# Patient Record
Sex: FEMALE | Race: WHITE | Employment: UNEMPLOYED | URBAN - METROPOLITAN AREA
[De-identification: names, ages, dates, MRNs, and addresses within clinical notes are randomized per-mention and may not be internally consistent; named-entity substitution may affect disease eponyms.]

---

## 2017-05-30 ENCOUNTER — HOSPITAL ENCOUNTER (OUTPATIENT)
Dept: ULTRASOUND IMAGING | Age: 32
Discharge: HOME | End: 2017-05-30
Attending: OTOLARYNGOLOGY
Payer: COMMERCIAL

## 2017-05-30 DIAGNOSIS — E04.2 NONTOXIC MULTINODULAR GOITER: ICD-10-CM

## 2017-05-30 PROCEDURE — 76536 US EXAM OF HEAD AND NECK: CPT | Performed by: OTOLARYNGOLOGY

## 2017-12-14 ENCOUNTER — OFFICE VISIT (OUTPATIENT)
Dept: INTERNAL MEDICINE CLINIC | Facility: CLINIC | Age: 32
End: 2017-12-14

## 2017-12-14 VITALS
HEART RATE: 78 BPM | RESPIRATION RATE: 17 BRPM | SYSTOLIC BLOOD PRESSURE: 90 MMHG | HEIGHT: 64 IN | DIASTOLIC BLOOD PRESSURE: 62 MMHG | BODY MASS INDEX: 22.71 KG/M2 | WEIGHT: 133 LBS | TEMPERATURE: 98 F

## 2017-12-14 DIAGNOSIS — J06.9 ACUTE UPPER RESPIRATORY INFECTION: Primary | ICD-10-CM

## 2017-12-14 PROCEDURE — 87880 STREP A ASSAY W/OPTIC: CPT | Performed by: INTERNAL MEDICINE

## 2017-12-14 PROCEDURE — 99213 OFFICE O/P EST LOW 20 MIN: CPT | Performed by: INTERNAL MEDICINE

## 2017-12-14 RX ORDER — CODEINE PHOSPHATE AND GUAIFENESIN 10; 100 MG/5ML; MG/5ML
5 SOLUTION ORAL EVERY 6 HOURS PRN
Qty: 118 ML | Refills: 0 | Status: SHIPPED | OUTPATIENT
Start: 2017-12-14 | End: 2018-07-09 | Stop reason: ALTCHOICE

## 2017-12-14 RX ORDER — AZITHROMYCIN 250 MG/1
TABLET, FILM COATED ORAL
Qty: 6 TABLET | Refills: 0 | Status: SHIPPED | OUTPATIENT
Start: 2017-12-14 | End: 2018-07-09 | Stop reason: ALTCHOICE

## 2017-12-14 NOTE — PROGRESS NOTES
Chucho Nolasco is a 28year old female. HPI:   Patient presents with:  Sore Throat: getting worse every day   Stuffy Nose: stuffy and runny nose  Patient presents with acute upper respiratory complaints. Symptoms going on for a week.   On the first da Diabetes in her maternal aunt; Hypertension in her father; Thyroid Disorder in her sister. Social:  reports that she has never smoked. She has never used smokeless tobacco. She reports that she drinks alcohol. She reports that she does not use drugs.   Wt

## 2017-12-14 NOTE — PATIENT INSTRUCTIONS
- You likely have a viral infection. Your strep test was negative. - Start codeine cough syrup. It can make you drowsy, so start by taking it at night only - if you take it during the day, be careful when driving/operating machinery.     - If you are not

## 2018-07-09 ENCOUNTER — OFFICE VISIT (OUTPATIENT)
Dept: INTERNAL MEDICINE CLINIC | Facility: CLINIC | Age: 33
End: 2018-07-09

## 2018-07-09 VITALS
SYSTOLIC BLOOD PRESSURE: 112 MMHG | DIASTOLIC BLOOD PRESSURE: 62 MMHG | TEMPERATURE: 99 F | WEIGHT: 137.75 LBS | RESPIRATION RATE: 18 BRPM | HEART RATE: 77 BPM | HEIGHT: 64 IN | BODY MASS INDEX: 23.52 KG/M2 | OXYGEN SATURATION: 99 %

## 2018-07-09 DIAGNOSIS — J02.9 SORE THROAT: Primary | ICD-10-CM

## 2018-07-09 PROCEDURE — 99213 OFFICE O/P EST LOW 20 MIN: CPT | Performed by: INTERNAL MEDICINE

## 2018-07-09 RX ORDER — CLARITHROMYCIN 250 MG/1
250 TABLET, FILM COATED ORAL 2 TIMES DAILY
Qty: 20 TABLET | Refills: 0 | Status: SHIPPED | OUTPATIENT
Start: 2018-07-09 | End: 2018-07-19

## 2018-07-09 NOTE — PROGRESS NOTES
Rubi Collier  10/18/1985 is a 28year old female who presents for upper respiratory symptoms    Patient presents with:  Sore Throat        HPI:   Pt reports  respiratory symptoms for  Few days   Throat sore had fever.        Current Outpatient Prescript visit:    Sore throat  -     clarithromycin 250 MG Oral Tab; Take 1 tablet (250 mg total) by mouth 2 (two) times daily. There are no Patient Instructions on file for this visit.     The patient indicates understanding of these issues and agrees to th

## 2018-09-11 DIAGNOSIS — G43.909 MIGRAINE: ICD-10-CM

## 2018-09-11 NOTE — TELEPHONE ENCOUNTER
Patient calling in requesting a refill for SUMAtriptan Succinate (IMITREX) 100 MG Oral Tab    To be sent to:   [address], [address] [place] [address], [phone], [phone]

## 2018-09-12 NOTE — TELEPHONE ENCOUNTER
Patient is calling to check on the status of her refill for SUMAtriptan Succinate (IMITREX) 100 MG Oral Tab. Patient is out of her prescription.  [facility] [address] [place] [address], [place] [address] AT [facility] OF [address] [place] [address], [phone], [phone]

## 2018-09-13 RX ORDER — SUMATRIPTAN 100 MG/1
100 TABLET, FILM COATED ORAL EVERY 2 HOUR PRN
Qty: 30 TABLET | Refills: 0 | Status: SHIPPED | OUTPATIENT
Start: 2018-09-13 | End: 2018-10-08

## 2018-09-13 NOTE — TELEPHONE ENCOUNTER
Medication(s) to Refill:   Requested Prescriptions     Pending Prescriptions Disp Refills   • SUMAtriptan Succinate 100 MG Oral Tab 30 tablet 0     Sig: Take 1 tablet (100 mg total) by mouth every 2 (two) hours as needed for Migraine (max 2 in 24 hrs).

## 2018-10-05 DIAGNOSIS — G43.909 MIGRAINE: ICD-10-CM

## 2018-10-05 NOTE — TELEPHONE ENCOUNTER
SUMAtriptan Succinate 100 MG   [facility] [id number] - [facility] AT [facility] OF [address] [place] [address], [phone], [phone]

## 2018-10-08 RX ORDER — SUMATRIPTAN 100 MG/1
100 TABLET, FILM COATED ORAL EVERY 2 HOUR PRN
Qty: 30 TABLET | Refills: 0 | Status: SHIPPED | OUTPATIENT
Start: 2018-10-08 | End: 2019-10-08

## 2019-01-29 ENCOUNTER — OFFICE VISIT (OUTPATIENT)
Dept: FAMILY MEDICINE CLINIC | Facility: CLINIC | Age: 34
End: 2019-01-29
Payer: COMMERCIAL

## 2019-01-29 VITALS
WEIGHT: 132 LBS | BODY MASS INDEX: 22.53 KG/M2 | TEMPERATURE: 98 F | HEIGHT: 64 IN | RESPIRATION RATE: 20 BRPM | OXYGEN SATURATION: 97 % | DIASTOLIC BLOOD PRESSURE: 70 MMHG | SYSTOLIC BLOOD PRESSURE: 98 MMHG | HEART RATE: 74 BPM

## 2019-01-29 DIAGNOSIS — J01.00 ACUTE NON-RECURRENT MAXILLARY SINUSITIS: Primary | ICD-10-CM

## 2019-01-29 PROCEDURE — 99213 OFFICE O/P EST LOW 20 MIN: CPT | Performed by: NURSE PRACTITIONER

## 2019-01-29 RX ORDER — DOXYCYCLINE HYCLATE 100 MG/1
100 CAPSULE ORAL 2 TIMES DAILY
Qty: 14 CAPSULE | Refills: 0 | Status: SHIPPED | OUTPATIENT
Start: 2019-01-29 | End: 2019-02-05

## 2019-01-29 NOTE — PROGRESS NOTES
CHIEF COMPLAINT:   Patient presents with:  Nasal Congestion: post nasal drip,ear/sinus pressure,chest congestion and coughing x 3 wks      HPI:   Atilio Callaway is a 35year old female who presents for cold symptoms for  3  weeks.  Symptoms have progressed i GENERAL:  normal appetite  SKIN: no rashes or abnormal skin lesions  HEENT: See HPI.     LUNGS: denies shortness of breath or wheezing, See HPI  CARDIOVASCULAR: denies chest pain or palpitations   GI: denies N/V/C or abdominal pain  NEURO: + sinus headaches • Doxycycline Hyclate 100 MG Oral Cap 14 capsule 0     Sig: Take 1 capsule (100 mg total) by mouth 2 (two) times daily for 7 days. Risks, benefits, side effects of medication addressed and explained.     Patient Instructions     Sinusitis (Antibiotic · You can use an over-the-counter decongestant, unless a similar medicine was prescribed to you. Nasal sprays work the fastest. Use one that contains phenylephrine or oxymetazoline. First blow your nose gently. Then use the spray.  Do not use these medicine · Don’t have close contact with people who have sore throats, colds, or other upper respiratory infections. · Don’t smoke, and stay away from secondhand smoke. · Stay up to date with of your vaccines.   Date Last Reviewed: 11/1/2017  © 7299-6577 [employer]

## 2020-02-01 ENCOUNTER — OFFICE VISIT (OUTPATIENT)
Dept: FAMILY MEDICINE CLINIC | Facility: CLINIC | Age: 35
End: 2020-02-01
Payer: COMMERCIAL

## 2020-02-01 VITALS
HEART RATE: 90 BPM | HEIGHT: 64 IN | TEMPERATURE: 98 F | RESPIRATION RATE: 18 BRPM | DIASTOLIC BLOOD PRESSURE: 70 MMHG | SYSTOLIC BLOOD PRESSURE: 107 MMHG | OXYGEN SATURATION: 97 % | WEIGHT: 125 LBS | BODY MASS INDEX: 21.34 KG/M2

## 2020-02-01 DIAGNOSIS — J10.1 INFLUENZA A: Primary | ICD-10-CM

## 2020-02-01 DIAGNOSIS — Z3A.11 11 WEEKS GESTATION OF PREGNANCY: ICD-10-CM

## 2020-02-01 DIAGNOSIS — R68.89 FLU-LIKE SYMPTOMS: ICD-10-CM

## 2020-02-01 LAB
OPERATOR ID: ABNORMAL
POCT INFLUENZA A: POSITIVE
POCT INFLUENZA B: NEGATIVE

## 2020-02-01 PROCEDURE — 87502 INFLUENZA DNA AMP PROBE: CPT | Performed by: FAMILY MEDICINE

## 2020-02-01 PROCEDURE — 99213 OFFICE O/P EST LOW 20 MIN: CPT | Performed by: FAMILY MEDICINE

## 2020-02-01 RX ORDER — OSELTAMIVIR PHOSPHATE 75 MG/1
75 CAPSULE ORAL 2 TIMES DAILY
Qty: 10 CAPSULE | Refills: 0 | Status: SHIPPED | OUTPATIENT
Start: 2020-02-01 | End: 2020-07-16 | Stop reason: ALTCHOICE

## 2020-02-01 NOTE — PROGRESS NOTES
Ramos Combs is a 29year old female. S:  Patient presents today with the following concerns:  Flu (flu like symptoms bodyaches, chills, sweats , cough, x 2 days    11 weeks pregnant. Pregnancy going well.  c -sections. Got flu vaccination. adenopathy  LUNGS: CTA, no RRW  CARDIO: RRR without murmur  EXTREMITIES: no edema  NEURO: Oriented times three,cranial nerves are intact,motor and sensory are grossly intact    Rapid Flu Test is POSITIVE for Influenza A  Negative for Influenza B.    ASSESS

## 2020-02-01 NOTE — PATIENT INSTRUCTIONS
Influenza (Adult)    Influenza is also called the flu. It is a viral illness that affects the air passages of your lungs. It is different from the common cold. The flu can easily be passed from one to person to another.  It may be spread through the air b If you are age 72 or older, talk with your provider about getting a pneumococcal vaccine every 5 years. You should also get this vaccine if you have chronic asthma or COPD. All adults should get a flu vaccine every fall. Ask your provider about this.   When Washing your hands often with soap and water can help keep you from catching the flu virus. Who is at risk for the flu? Anyone can get the flu.  But you are more likely to catch the flu if you:  · Are often around young children  · Work in a healthcare s · Your healthcare provider may prescribe medicines called antivirals. These medicines must be taken within 2 days of when your symptoms started.  In some cases, your healthcare provider may not wait for test results to come back before starting you on antiv · Stay away from crowds and children as much as possible while you are pregnant. Stay away from anyone who has the flu. Tips for good handwashing  Handwashing is one of the best ways to prevent many common infections.  Follow these steps for more effective

## 2020-02-12 ENCOUNTER — OFFICE VISIT (OUTPATIENT)
Dept: FAMILY MEDICINE CLINIC | Facility: CLINIC | Age: 35
End: 2020-02-12
Payer: COMMERCIAL

## 2020-02-12 VITALS
RESPIRATION RATE: 18 BRPM | WEIGHT: 128 LBS | BODY MASS INDEX: 21.85 KG/M2 | DIASTOLIC BLOOD PRESSURE: 60 MMHG | HEART RATE: 77 BPM | HEIGHT: 64 IN | TEMPERATURE: 98 F | SYSTOLIC BLOOD PRESSURE: 100 MMHG | OXYGEN SATURATION: 99 %

## 2020-02-12 DIAGNOSIS — H10.022 OTHER MUCOPURULENT CONJUNCTIVITIS OF LEFT EYE: Primary | ICD-10-CM

## 2020-02-12 DIAGNOSIS — J01.40 ACUTE PANSINUSITIS, RECURRENCE NOT SPECIFIED: ICD-10-CM

## 2020-02-12 PROCEDURE — 99213 OFFICE O/P EST LOW 20 MIN: CPT | Performed by: NURSE PRACTITIONER

## 2020-02-12 RX ORDER — AZITHROMYCIN 250 MG/1
TABLET, FILM COATED ORAL
Qty: 6 TABLET | Refills: 0 | Status: SHIPPED | OUTPATIENT
Start: 2020-02-12 | End: 2020-07-16 | Stop reason: ALTCHOICE

## 2020-02-12 RX ORDER — OFLOXACIN 3 MG/ML
2 SOLUTION/ DROPS OPHTHALMIC 4 TIMES DAILY
Qty: 1 BOTTLE | Refills: 0 | Status: SHIPPED | OUTPATIENT
Start: 2020-02-12 | End: 2020-02-19

## 2020-02-12 NOTE — PROGRESS NOTES
CHIEF COMPLAINT:   Patient presents with:  Sinus Problem: s/s for 1 week. No OTC meds taken  Eye Problem: left eye drainage, tearing s/s for 2 days      HPI:   Kee Jeans is a 29year old female who presents for cold symptoms for  1  weeks.  Symptoms ha Smoking status: Never Smoker      Smokeless tobacco: Never Used    Alcohol use: Not Currently      Alcohol/week: 0.0 standard drinks      Comment: rare    Drug use: No        REVIEW OF SYSTEMS:   GENERAL:  denies  diminished appetite  SKIN: no rashes o • ofloxacin 0.3 % Ophthalmic Solution 1 Bottle 0     Sig: Place 2 drops into the left eye 4 (four) times daily for 7 days. • azithromycin (ZITHROMAX Z-JAGRUTI) 250 MG Oral Tab 6 tablet 0     Sig: Take two tablets by mouth today, then one tablet daily. · Increased blurry vision or increased sensitivity to light  · Failure of normal vision to return within 24 to 48 hours  Date Last Reviewed: 7/1/2017  © 2923-4339 [employer]. [address] [facility] [place] [facility], [address] [place] [address]. All rights reserved.  Sulema Barthel Your doctor may prescribe medications to help treat your sinusitis. If you have an infection, antibiotics can help clear it up. If you are prescribed antibiotics, take all pills on schedule until they are gone, even if you feel better.  Decongestants help r · You can use an over-the-counter decongestant, unless a similar medicine was prescribed to you. Nasal sprays work the fastest. Use one that contains phenylephrine or oxymetazoline. First blow your nose gently. Then use the spray.  Do not use these medicine · Don’t have close contact with people who have sore throats, colds, or other upper respiratory infections. · Don’t smoke, and stay away from secondhand smoke. · Stay up to date with of your vaccines.   Date Last Reviewed: 11/1/2017  © 1948-2687 [employer]

## 2020-02-12 NOTE — PATIENT INSTRUCTIONS
Humidifier in room  Sleep propped  Push fluids  Limit dairy  Benadryl as needed    Conjunctivitis, Nonspecific    The membrane that covers the white part of your eye (the conjunctiva) is inflamed.  Inflammation happens when your body responds to an injury Self-Care for Sinusitis     Drinking plenty of water can help sinuses drain. Sinusitis can often be managed with self-care. Self-care can keep sinuses moist and make you feel more comfortable. Remember to follow your doctor's instructions closely.  This c Sinusitis (Antibiotic Treatment)    The sinuses are air-filled spaces within the bones of the face. They connect to the inside of the nose. Sinusitis is an inflammation of the tissue that lines the sinuses. Sinusitis can occur during a cold.  It can also ha · Do not use nasal rinses or irrigation during an acute sinus infection, unless your healthcare provider tells you to. Rinsing may spread the infection to other areas in your sinuses.   · Use acetaminophen or ibuprofen to control pain, unless another pain m

## 2020-02-25 PROBLEM — Z34.80 SUPERVISION OF OTHER NORMAL PREGNANCY, ANTEPARTUM (HCC): Status: ACTIVE | Noted: 2020-02-25

## 2020-02-25 PROBLEM — Z34.80 SUPERVISION OF OTHER NORMAL PREGNANCY, ANTEPARTUM: Status: ACTIVE | Noted: 2020-02-25

## 2020-03-11 PROBLEM — O34.219 PREVIOUS CESAREAN DELIVERY AFFECTING PREGNANCY: Status: ACTIVE | Noted: 2020-03-11

## 2020-03-11 PROBLEM — O34.219 PREVIOUS CESAREAN DELIVERY AFFECTING PREGNANCY (HCC): Status: ACTIVE | Noted: 2020-03-11

## 2020-05-29 ENCOUNTER — HOSPITAL ENCOUNTER (OUTPATIENT)
Facility: HOSPITAL | Age: 35
Setting detail: OBSERVATION
Discharge: HOME | End: 2020-05-30
Attending: OBSTETRICS & GYNECOLOGY | Admitting: OBSTETRICS & GYNECOLOGY
Payer: COMMERCIAL

## 2020-05-29 VITALS
HEART RATE: 75 BPM | SYSTOLIC BLOOD PRESSURE: 102 MMHG | TEMPERATURE: 98 F | DIASTOLIC BLOOD PRESSURE: 59 MMHG | RESPIRATION RATE: 16 BRPM

## 2020-05-29 PROBLEM — Z34.90 PREGNANCY: Status: ACTIVE | Noted: 2020-05-29

## 2020-05-29 PROBLEM — Z34.90 PREGNANCY (HCC): Status: ACTIVE | Noted: 2020-05-29

## 2020-05-30 PROCEDURE — 86900 BLOOD TYPING SEROLOGIC ABO: CPT | Performed by: OBSTETRICS & GYNECOLOGY

## 2020-05-30 PROCEDURE — 36415 COLL VENOUS BLD VENIPUNCTURE: CPT

## 2020-05-30 PROCEDURE — 85018 HEMOGLOBIN: CPT | Performed by: OBSTETRICS & GYNECOLOGY

## 2020-05-30 PROCEDURE — 86780 TREPONEMA PALLIDUM: CPT | Performed by: OBSTETRICS & GYNECOLOGY

## 2020-05-30 PROCEDURE — 86901 BLOOD TYPING SEROLOGIC RH(D): CPT | Performed by: OBSTETRICS & GYNECOLOGY

## 2020-05-30 PROCEDURE — 86850 RBC ANTIBODY SCREEN: CPT | Performed by: OBSTETRICS & GYNECOLOGY

## 2020-05-30 PROCEDURE — 86701 HIV-1ANTIBODY: CPT | Performed by: OBSTETRICS & GYNECOLOGY

## 2020-05-30 PROCEDURE — 99213 OFFICE O/P EST LOW 20 MIN: CPT

## 2020-05-30 PROCEDURE — 85014 HEMATOCRIT: CPT | Performed by: OBSTETRICS & GYNECOLOGY

## 2020-05-30 PROCEDURE — 82950 GLUCOSE TEST: CPT | Performed by: OBSTETRICS & GYNECOLOGY

## 2020-05-30 NOTE — PLAN OF CARE
Pt would like to do 28 week glucose challenge now, Dr. Sai Horne states pt may do it tonight. Lab called for instructions. Pt to drink 50 glucose within 5 minutes, then draw glucose at 60 minute delmi. Pt finished glucose at 2300, will draw patient blood at 0000.

## 2020-05-30 NOTE — PLAN OF CARE
Pt is a  at 28.1 weeks in triage for s/p fall at 1630. Pt states she tripped over a toy car and fell on her hands and knees, did not hit abdomen. Pt denies cramping or vaginal bleeding. efm tested and applied.

## 2020-05-30 NOTE — NST
Nonstress Test   Patient: Kelly Ortez    Gestation: 28w2d    NST:       Variability: Moderate           Accelerations: Yes           Decelerations: None            Baseline: 130 BPM           Uterine Irritability: Yes           Contractions: Irregular

## 2020-05-30 NOTE — PLAN OF CARE
Labs drawn. Pt off monitors. Discharge instructions reviewed including s/s of  labor. Pt verbalizes understanding and is in agreement. Pt left unit ambulatory. pt has office visit .

## 2020-06-29 DIAGNOSIS — Z34.90 PREGNANCY: Primary | ICD-10-CM

## 2020-08-13 PROBLEM — Z34.90 PREGNANCY: Status: RESOLVED | Noted: 2020-05-29 | Resolved: 2020-08-13

## 2020-08-13 PROBLEM — Z34.80 SUPERVISION OF OTHER NORMAL PREGNANCY, ANTEPARTUM: Status: RESOLVED | Noted: 2020-02-25 | Resolved: 2020-08-13

## 2020-08-13 PROBLEM — Z34.90 PREGNANCY (HCC): Status: RESOLVED | Noted: 2020-05-29 | Resolved: 2020-08-13

## 2020-08-13 PROBLEM — Z34.80 SUPERVISION OF OTHER NORMAL PREGNANCY, ANTEPARTUM (HCC): Status: RESOLVED | Noted: 2020-02-25 | Resolved: 2020-08-13

## 2020-08-15 ENCOUNTER — LAB ENCOUNTER (OUTPATIENT)
Dept: LAB | Facility: HOSPITAL | Age: 35
End: 2020-08-15
Attending: OBSTETRICS & GYNECOLOGY
Payer: COMMERCIAL

## 2020-08-15 DIAGNOSIS — Z34.90 PREGNANCY: ICD-10-CM

## 2020-08-16 ENCOUNTER — TELEPHONE (OUTPATIENT)
Dept: OBGYN UNIT | Facility: HOSPITAL | Age: 35
End: 2020-08-16

## 2020-08-16 RX ORDER — PHYTONADIONE (VIT K1) 100 MCG
550 TABLET ORAL
COMMUNITY
End: 2021-05-27 | Stop reason: ALTCHOICE

## 2020-08-16 RX ORDER — MULTIVIT WITH MINERALS/LUTEIN
1000 TABLET ORAL DAILY
COMMUNITY

## 2020-08-16 NOTE — PROGRESS NOTES
Pt given arrival instructions. RN reviewed  procedures, general plan of care, and  pain medication options. Questions answered. Pt verbalizes understanding. Travel and covid screening done.

## 2020-08-17 LAB — SARS-COV-2 RNA RESP QL NAA+PROBE: NOT DETECTED

## 2020-08-18 ENCOUNTER — ANESTHESIA (OUTPATIENT)
Dept: OBGYN UNIT | Facility: HOSPITAL | Age: 35
End: 2020-08-18
Payer: COMMERCIAL

## 2020-08-18 ENCOUNTER — ANESTHESIA EVENT (OUTPATIENT)
Dept: OBGYN UNIT | Facility: HOSPITAL | Age: 35
End: 2020-08-18
Payer: COMMERCIAL

## 2020-08-18 ENCOUNTER — HOSPITAL ENCOUNTER (INPATIENT)
Facility: HOSPITAL | Age: 35
LOS: 3 days | Discharge: HOME | End: 2020-08-21
Attending: OBSTETRICS & GYNECOLOGY | Admitting: OBSTETRICS & GYNECOLOGY
Payer: COMMERCIAL

## 2020-08-18 PROBLEM — Z34.90 PREGNANCY (HCC): Status: ACTIVE | Noted: 2020-08-18

## 2020-08-18 PROBLEM — Z34.90 PREGNANCY: Status: ACTIVE | Noted: 2020-08-18

## 2020-08-18 LAB
ANTIBODY SCREEN: NEGATIVE
BASOPHILS # BLD AUTO: 0.03 X10(3) UL (ref 0–0.2)
BASOPHILS NFR BLD AUTO: 0.3 %
DEPRECATED RDW RBC AUTO: 42.9 FL (ref 35.1–46.3)
EOSINOPHIL # BLD AUTO: 0.13 X10(3) UL (ref 0–0.7)
EOSINOPHIL NFR BLD AUTO: 1.5 %
ERYTHROCYTE [DISTWIDTH] IN BLOOD BY AUTOMATED COUNT: 13.2 % (ref 11–15)
FETAL SCREEN RESULT: NEGATIVE
HCT VFR BLD AUTO: 37.7 % (ref 35–48)
HGB BLD-MCNC: 12.6 G/DL (ref 12–16)
IMM GRANULOCYTES # BLD AUTO: 0.05 X10(3) UL (ref 0–1)
IMM GRANULOCYTES NFR BLD: 0.6 %
LYMPHOCYTES # BLD AUTO: 2.22 X10(3) UL (ref 1–4)
LYMPHOCYTES NFR BLD AUTO: 25.1 %
MCH RBC QN AUTO: 30.4 PG (ref 26–34)
MCHC RBC AUTO-ENTMCNC: 33.4 G/DL (ref 31–37)
MCV RBC AUTO: 90.8 FL (ref 80–100)
MONOCYTES # BLD AUTO: 0.59 X10(3) UL (ref 0.1–1)
MONOCYTES NFR BLD AUTO: 6.7 %
NEUTROPHILS # BLD AUTO: 5.84 X10 (3) UL (ref 1.5–7.7)
NEUTROPHILS # BLD AUTO: 5.84 X10(3) UL (ref 1.5–7.7)
NEUTROPHILS NFR BLD AUTO: 65.8 %
PLATELET # BLD AUTO: 262 10(3)UL (ref 150–450)
RBC # BLD AUTO: 4.15 X10(6)UL (ref 3.8–5.3)
RH BLOOD TYPE: NEGATIVE
T PALLIDUM AB SER QL IA: NONREACTIVE
WBC # BLD AUTO: 8.9 X10(3) UL (ref 4–11)

## 2020-08-18 PROCEDURE — 3E0334Z INTRODUCTION OF SERUM, TOXOID AND VACCINE INTO PERIPHERAL VEIN, PERCUTANEOUS APPROACH: ICD-10-PCS | Performed by: OBSTETRICS & GYNECOLOGY

## 2020-08-18 PROCEDURE — 86901 BLOOD TYPING SEROLOGIC RH(D): CPT | Performed by: OBSTETRICS & GYNECOLOGY

## 2020-08-18 PROCEDURE — 85461 HEMOGLOBIN FETAL: CPT | Performed by: OBSTETRICS & GYNECOLOGY

## 2020-08-18 PROCEDURE — 86850 RBC ANTIBODY SCREEN: CPT | Performed by: OBSTETRICS & GYNECOLOGY

## 2020-08-18 PROCEDURE — 86900 BLOOD TYPING SEROLOGIC ABO: CPT | Performed by: OBSTETRICS & GYNECOLOGY

## 2020-08-18 PROCEDURE — 86780 TREPONEMA PALLIDUM: CPT | Performed by: OBSTETRICS & GYNECOLOGY

## 2020-08-18 PROCEDURE — 85025 COMPLETE CBC W/AUTO DIFF WBC: CPT | Performed by: OBSTETRICS & GYNECOLOGY

## 2020-08-18 RX ORDER — GENTAMICIN SULFATE 40 MG/ML
INJECTION, SOLUTION INTRAMUSCULAR; INTRAVENOUS AS NEEDED
Status: DISCONTINUED | OUTPATIENT
Start: 2020-08-18 | End: 2020-08-18

## 2020-08-18 RX ORDER — ONDANSETRON 2 MG/ML
4 INJECTION INTRAMUSCULAR; INTRAVENOUS ONCE AS NEEDED
Status: DISCONTINUED | OUTPATIENT
Start: 2020-08-18 | End: 2020-08-18 | Stop reason: HOSPADM

## 2020-08-18 RX ORDER — SODIUM CHLORIDE, SODIUM LACTATE, POTASSIUM CHLORIDE, CALCIUM CHLORIDE 600; 310; 30; 20 MG/100ML; MG/100ML; MG/100ML; MG/100ML
INJECTION, SOLUTION INTRAVENOUS CONTINUOUS
Status: DISCONTINUED | OUTPATIENT
Start: 2020-08-18 | End: 2020-08-18 | Stop reason: HOSPADM

## 2020-08-18 RX ORDER — PHENYLEPHRINE HCL 10 MG/ML
VIAL (ML) INJECTION AS NEEDED
Status: DISCONTINUED | OUTPATIENT
Start: 2020-08-18 | End: 2020-08-18 | Stop reason: SURG

## 2020-08-18 RX ORDER — HYDROMORPHONE HYDROCHLORIDE 1 MG/ML
0.4 INJECTION, SOLUTION INTRAMUSCULAR; INTRAVENOUS; SUBCUTANEOUS EVERY 5 MIN PRN
Status: DISCONTINUED | OUTPATIENT
Start: 2020-08-18 | End: 2020-08-18 | Stop reason: HOSPADM

## 2020-08-18 RX ORDER — DIPHENHYDRAMINE HYDROCHLORIDE 50 MG/ML
25 INJECTION INTRAMUSCULAR; INTRAVENOUS ONCE AS NEEDED
Status: DISCONTINUED | OUTPATIENT
Start: 2020-08-18 | End: 2020-08-18 | Stop reason: HOSPADM

## 2020-08-18 RX ORDER — SODIUM CHLORIDE 9 MG/ML
100 INJECTION, SOLUTION INTRAVENOUS ONCE
Status: COMPLETED | OUTPATIENT
Start: 2020-08-18 | End: 2020-08-18

## 2020-08-18 RX ORDER — GENTAMICIN SULFATE 40 MG/ML
5 INJECTION, SOLUTION INTRAMUSCULAR; INTRAVENOUS ONCE
Status: COMPLETED | OUTPATIENT
Start: 2020-08-18 | End: 2020-08-18

## 2020-08-18 RX ORDER — MORPHINE SULFATE 2 MG/ML
INJECTION, SOLUTION INTRAMUSCULAR; INTRAVENOUS AS NEEDED
Status: DISCONTINUED | OUTPATIENT
Start: 2020-08-18 | End: 2020-08-18 | Stop reason: SURG

## 2020-08-18 RX ORDER — MIDAZOLAM HYDROCHLORIDE 1 MG/ML
INJECTION INTRAMUSCULAR; INTRAVENOUS AS NEEDED
Status: DISCONTINUED | OUTPATIENT
Start: 2020-08-18 | End: 2020-08-18 | Stop reason: SURG

## 2020-08-18 RX ORDER — METOCLOPRAMIDE HYDROCHLORIDE 5 MG/ML
INJECTION INTRAMUSCULAR; INTRAVENOUS AS NEEDED
Status: DISCONTINUED | OUTPATIENT
Start: 2020-08-18 | End: 2020-08-18 | Stop reason: SURG

## 2020-08-18 RX ORDER — HYDROCODONE BITARTRATE AND ACETAMINOPHEN 10; 325 MG/1; MG/1
1 TABLET ORAL EVERY 4 HOURS PRN
Status: DISCONTINUED | OUTPATIENT
Start: 2020-08-18 | End: 2020-08-21

## 2020-08-18 RX ORDER — MORPHINE SULFATE 0.5 MG/ML
0.2 INJECTION, SOLUTION EPIDURAL; INTRATHECAL; INTRAVENOUS ONCE
Status: DISCONTINUED | OUTPATIENT
Start: 2020-08-18 | End: 2020-08-18 | Stop reason: HOSPADM

## 2020-08-18 RX ORDER — IBUPROFEN 600 MG/1
600 TABLET ORAL EVERY 6 HOURS SCHEDULED
Status: DISCONTINUED | OUTPATIENT
Start: 2020-08-19 | End: 2020-08-21

## 2020-08-18 RX ORDER — KETOROLAC TROMETHAMINE 30 MG/ML
30 INJECTION, SOLUTION INTRAMUSCULAR; INTRAVENOUS ONCE AS NEEDED
Status: COMPLETED | OUTPATIENT
Start: 2020-08-18 | End: 2020-08-18

## 2020-08-18 RX ORDER — TRISODIUM CITRATE DIHYDRATE AND CITRIC ACID MONOHYDRATE 500; 334 MG/5ML; MG/5ML
30 SOLUTION ORAL ONCE
Status: COMPLETED | OUTPATIENT
Start: 2020-08-18 | End: 2020-08-18

## 2020-08-18 RX ORDER — BISACODYL 10 MG
10 SUPPOSITORY, RECTAL RECTAL
Status: DISCONTINUED | OUTPATIENT
Start: 2020-08-18 | End: 2020-08-21

## 2020-08-18 RX ORDER — ONDANSETRON 2 MG/ML
4 INJECTION INTRAMUSCULAR; INTRAVENOUS EVERY 6 HOURS PRN
Status: DISCONTINUED | OUTPATIENT
Start: 2020-08-18 | End: 2020-08-21

## 2020-08-18 RX ORDER — SIMETHICONE 80 MG
80 TABLET,CHEWABLE ORAL 3 TIMES DAILY PRN
Status: DISCONTINUED | OUTPATIENT
Start: 2020-08-18 | End: 2020-08-21

## 2020-08-18 RX ORDER — SODIUM CHLORIDE, SODIUM LACTATE, POTASSIUM CHLORIDE, CALCIUM CHLORIDE 600; 310; 30; 20 MG/100ML; MG/100ML; MG/100ML; MG/100ML
INJECTION, SOLUTION INTRAVENOUS CONTINUOUS
Status: DISCONTINUED | OUTPATIENT
Start: 2020-08-18 | End: 2020-08-21

## 2020-08-18 RX ORDER — HYDROMORPHONE HYDROCHLORIDE 1 MG/ML
0.4 INJECTION, SOLUTION INTRAMUSCULAR; INTRAVENOUS; SUBCUTANEOUS EVERY 2 HOUR PRN
Status: ACTIVE | OUTPATIENT
Start: 2020-08-18 | End: 2020-08-19

## 2020-08-18 RX ORDER — DOCUSATE SODIUM 100 MG/1
100 CAPSULE, LIQUID FILLED ORAL
Status: DISCONTINUED | OUTPATIENT
Start: 2020-08-19 | End: 2020-08-21

## 2020-08-18 RX ORDER — DIPHENHYDRAMINE HCL 25 MG
25 CAPSULE ORAL EVERY 4 HOURS PRN
Status: DISCONTINUED | OUTPATIENT
Start: 2020-08-18 | End: 2020-08-21

## 2020-08-18 RX ORDER — DIPHENHYDRAMINE HYDROCHLORIDE 50 MG/ML
12.5 INJECTION INTRAMUSCULAR; INTRAVENOUS EVERY 4 HOURS PRN
Status: DISCONTINUED | OUTPATIENT
Start: 2020-08-18 | End: 2020-08-21

## 2020-08-18 RX ORDER — NALOXONE HYDROCHLORIDE 0.4 MG/ML
0.08 INJECTION, SOLUTION INTRAMUSCULAR; INTRAVENOUS; SUBCUTANEOUS
Status: ACTIVE | OUTPATIENT
Start: 2020-08-18 | End: 2020-08-19

## 2020-08-18 RX ORDER — CLINDAMYCIN PHOSPHATE 900 MG/50ML
900 INJECTION INTRAVENOUS ONCE
Status: COMPLETED | OUTPATIENT
Start: 2020-08-18 | End: 2020-08-18

## 2020-08-18 RX ORDER — DEXAMETHASONE SODIUM PHOSPHATE 4 MG/ML
VIAL (ML) INJECTION AS NEEDED
Status: DISCONTINUED | OUTPATIENT
Start: 2020-08-18 | End: 2020-08-18 | Stop reason: SURG

## 2020-08-18 RX ORDER — ONDANSETRON 2 MG/ML
INJECTION INTRAMUSCULAR; INTRAVENOUS AS NEEDED
Status: DISCONTINUED | OUTPATIENT
Start: 2020-08-18 | End: 2020-08-18 | Stop reason: SURG

## 2020-08-18 RX ORDER — ZOLPIDEM TARTRATE 5 MG/1
5 TABLET ORAL NIGHTLY PRN
Status: DISCONTINUED | OUTPATIENT
Start: 2020-08-18 | End: 2020-08-21

## 2020-08-18 RX ORDER — KETOROLAC TROMETHAMINE 30 MG/ML
30 INJECTION, SOLUTION INTRAMUSCULAR; INTRAVENOUS EVERY 6 HOURS PRN
Status: DISPENSED | OUTPATIENT
Start: 2020-08-18 | End: 2020-08-19

## 2020-08-18 RX ORDER — MORPHINE SULFATE 4 MG/ML
2 INJECTION, SOLUTION INTRAMUSCULAR; INTRAVENOUS EVERY 2 HOUR PRN
Status: ACTIVE | OUTPATIENT
Start: 2020-08-18 | End: 2020-08-19

## 2020-08-18 RX ORDER — HYDROCODONE BITARTRATE AND ACETAMINOPHEN 5; 325 MG/1; MG/1
1 TABLET ORAL EVERY 4 HOURS PRN
Status: DISCONTINUED | OUTPATIENT
Start: 2020-08-18 | End: 2020-08-21

## 2020-08-18 RX ORDER — BUPIVACAINE HYDROCHLORIDE 7.5 MG/ML
INJECTION, SOLUTION INTRASPINAL AS NEEDED
Status: DISCONTINUED | OUTPATIENT
Start: 2020-08-18 | End: 2020-08-18 | Stop reason: SURG

## 2020-08-18 RX ADMIN — METOCLOPRAMIDE HYDROCHLORIDE 10 MG: 5 INJECTION INTRAMUSCULAR; INTRAVENOUS at 09:32:00

## 2020-08-18 RX ADMIN — SODIUM CHLORIDE, SODIUM LACTATE, POTASSIUM CHLORIDE, CALCIUM CHLORIDE: 600; 310; 30; 20 INJECTION, SOLUTION INTRAVENOUS at 08:55:00

## 2020-08-18 RX ADMIN — SODIUM CHLORIDE, SODIUM LACTATE, POTASSIUM CHLORIDE, CALCIUM CHLORIDE: 600; 310; 30; 20 INJECTION, SOLUTION INTRAVENOUS at 09:57:00

## 2020-08-18 RX ADMIN — MIDAZOLAM HYDROCHLORIDE 2 MG: 1 INJECTION INTRAMUSCULAR; INTRAVENOUS at 09:38:00

## 2020-08-18 RX ADMIN — ONDANSETRON 4 MG: 2 INJECTION INTRAMUSCULAR; INTRAVENOUS at 09:32:00

## 2020-08-18 RX ADMIN — PHENYLEPHRINE HCL 100 MCG: 10 MG/ML VIAL (ML) INJECTION at 09:34:00

## 2020-08-18 RX ADMIN — DEXAMETHASONE SODIUM PHOSPHATE 4 MG: 4 MG/ML VIAL (ML) INJECTION at 09:32:00

## 2020-08-18 RX ADMIN — MORPHINE SULFATE 0.2 MG: 2 INJECTION, SOLUTION INTRAMUSCULAR; INTRAVENOUS at 09:15:00

## 2020-08-18 RX ADMIN — PHENYLEPHRINE HCL 100 MCG: 10 MG/ML VIAL (ML) INJECTION at 09:16:00

## 2020-08-18 RX ADMIN — CLINDAMYCIN PHOSPHATE 900 MG: 900 INJECTION INTRAVENOUS at 09:20:00

## 2020-08-18 RX ADMIN — BUPIVACAINE HYDROCHLORIDE 1.5 ML: 7.5 INJECTION, SOLUTION INTRASPINAL at 09:15:00

## 2020-08-18 RX ADMIN — PHENYLEPHRINE HCL 100 MCG: 10 MG/ML VIAL (ML) INJECTION at 09:24:00

## 2020-08-18 RX ADMIN — MORPHINE SULFATE 1.8 MG: 2 INJECTION, SOLUTION INTRAMUSCULAR; INTRAVENOUS at 09:32:00

## 2020-08-18 NOTE — BRIEF OP NOTE
Pre-Operative Diagnosis: 39.5 IUP, previous c/s     Post-Operative Diagnosis: 39.5 IUP, previous c/s     Procedure Performed: repeat low transverse  section  Procedure(s):      Surgeon(s) and Role:     * Gabby Vital MD - Primary     * Weeks, Pe

## 2020-08-18 NOTE — PROGRESS NOTES
Patient transferred to mother/baby room 1104 per cart in stable condition with baby and personal belongings. Accompanied by  and staff. Report given to mother/baby RN.

## 2020-08-18 NOTE — PROGRESS NOTES
Pt is a 29year old female admitted to [facility]. Patient presents with:  Scheduled : Repeat     Pt is  39w5d intra-uterine pregnancy. History obtained, consents signed. Oriented to room, staff, and plan of care.

## 2020-08-18 NOTE — PLAN OF CARE
Problem: SAFETY ADULT - FALL  Goal: Free from fall injury  Description  INTERVENTIONS:  - Assess pt frequently for physical needs  - Identify cognitive and physical deficits and behaviors that affect risk of falls.   - [place] fall precautions as indica

## 2020-08-18 NOTE — H&P
[address] and Delivery Prenatal History and Physical Interval Addendum  Please see full Prenatal Record for this pregnancy      SUBJECTIVE:    Interval History:      This is a pregnancy at 39w5d weeks admitted for repeat c/s    OBJECTIVE:  Temp

## 2020-08-18 NOTE — ANESTHESIA PROCEDURE NOTES
Spinal Block  Performed by: Willy Ennis MD  Authorized by: Willy Ennis MD       General Information and Staff    Start Time:  8/18/2020 9:04 AM  End Time:  8/18/2020 9:15 AM  Anesthesiologist:  Willy Ennis MD  Performed by:   Anesthesiologist  Site id

## 2020-08-18 NOTE — ANESTHESIA PREPROCEDURE EVALUATION
PRE-OP EVALUATION    Patient Name: Jose Manuel Dorman    Pre-op Diagnosis: * No pre-op diagnosis entered *    Procedure(s):      Surgeon(s) and Role:     * Steffany Al MD - Primary     * Andrew Briones MD - Assisting Surgeon    Pre-op vitals reviewed.   Tem ROS.                                Past Surgical History:   Procedure Laterality Date   •       x2   • CAUTER TURBINATE MUCOSA,SUPERFICIAL     •  SECTION N/A 2015    Performed by Jose Albrecht MD at [facility] OR   • [address] [place] [address]

## 2020-08-18 NOTE — ANESTHESIA POSTPROCEDURE EVALUATION
[address] . [facility] Patient Status:  Inpatient   Age/Gender 29year old female MRN [id number]   Location [facility] Attending Vi Chopra MD   Hosp Day # 0 PCP Eveline Figueroa MD       Anesthesia Post-op Note    Procedu

## 2020-08-18 NOTE — OPERATIVE REPORT
[facility]    PATIENT'S NAME: Padmini Marquez   ATTENDING PHYSICIAN: Geraldine Rivera M.D. OPERATING PHYSICIAN: Fidel Scott M.D.    PATIENT ACCOUNT#:   [de-identified]    LOCATION:  [address] [place] [address]  MEDICAL RECORD #:   [id number]       DATE OF BIRTH:  [birth year]/ midline with a scalpel, entered fully with the tip of my finger, extended bilaterally with cephalocaudal manual traction. The infant's head was grasped, brought into the incision, and delivered through the incision with fundal pressure.   Nose and mouth we

## 2020-08-18 NOTE — PLAN OF CARE
Problem: SAFETY ADULT - FALL  Goal: Free from fall injury  Description  INTERVENTIONS:  - Assess pt frequently for physical needs  - Identify cognitive and physical deficits and behaviors that affect risk of falls.   - [place] fall precautions as indica allow for food preferences  - Enhance eating environment  Outcome: Progressing  Goal: Achieves appropriate nutritional intake (bariatric)  Description  INTERVENTIONS:  - Monitor for over-consumption  - Identify factors contributing to increased intake, ivory Discuss/demonstrate breast feeding aids (e.g., infant sling, nursing footstool/pillows, and breast pumps). - Encourage mother/other family members to express feelings/concerns, and actively listen.   - Educate father/SO about benefits of breast feeding and

## 2020-08-19 PROBLEM — O34.219 PREVIOUS CESAREAN DELIVERY AFFECTING PREGNANCY (HCC): Status: RESOLVED | Noted: 2020-03-11 | Resolved: 2020-08-18

## 2020-08-19 PROBLEM — O34.219 PREVIOUS CESAREAN DELIVERY AFFECTING PREGNANCY: Status: RESOLVED | Noted: 2020-03-11 | Resolved: 2020-08-18

## 2020-08-19 LAB
BASOPHILS # BLD AUTO: 0.04 X10(3) UL (ref 0–0.2)
BASOPHILS NFR BLD AUTO: 0.3 %
DEPRECATED RDW RBC AUTO: 42.8 FL (ref 35.1–46.3)
EOSINOPHIL # BLD AUTO: 0.11 X10(3) UL (ref 0–0.7)
EOSINOPHIL NFR BLD AUTO: 0.9 %
ERYTHROCYTE [DISTWIDTH] IN BLOOD BY AUTOMATED COUNT: 13.2 % (ref 11–15)
HCT VFR BLD AUTO: 29.2 % (ref 35–48)
HGB BLD-MCNC: 10 G/DL (ref 12–16)
IMM GRANULOCYTES # BLD AUTO: 0.06 X10(3) UL (ref 0–1)
IMM GRANULOCYTES NFR BLD: 0.5 %
LYMPHOCYTES # BLD AUTO: 3.15 X10(3) UL (ref 1–4)
LYMPHOCYTES NFR BLD AUTO: 25.8 %
MCH RBC QN AUTO: 30.4 PG (ref 26–34)
MCHC RBC AUTO-ENTMCNC: 34.2 G/DL (ref 31–37)
MCV RBC AUTO: 88.8 FL (ref 80–100)
MONOCYTES # BLD AUTO: 1.07 X10(3) UL (ref 0.1–1)
MONOCYTES NFR BLD AUTO: 8.8 %
NEUTROPHILS # BLD AUTO: 7.76 X10 (3) UL (ref 1.5–7.7)
NEUTROPHILS # BLD AUTO: 7.76 X10(3) UL (ref 1.5–7.7)
NEUTROPHILS NFR BLD AUTO: 63.7 %
PLATELET # BLD AUTO: 203 10(3)UL (ref 150–450)
RBC # BLD AUTO: 3.29 X10(6)UL (ref 3.8–5.3)
WBC # BLD AUTO: 12.2 X10(3) UL (ref 4–11)

## 2020-08-19 PROCEDURE — 85025 COMPLETE CBC W/AUTO DIFF WBC: CPT | Performed by: OBSTETRICS & GYNECOLOGY

## 2020-08-19 NOTE — PROGRESS NOTES
[address] 1SW-J malena Luna Patient Status:  Inpatient   Age/Gender 29year old female MRN [id number]   [place] 1SW-J Attending Sukhwinder Abreu MD   Hosp Day # 1 PCP Shanthi Cee MD      Anesthesia Pain Progress Note

## 2020-08-19 NOTE — PLAN OF CARE
Up in chair during evening, starting to pass gas, tolerated full liquid dinner, advanced to reg bkfst.  Pt did not get much sleep, baby rooming in and restless, not sleeping much. Rodrigo d/swetha.       Problem: POSTPARTUM  Goal: Long Term Goal:Experiences jared

## 2020-08-19 NOTE — PLAN OF CARE
Problem: SAFETY ADULT - FALL  Goal: Free from fall injury  Description  INTERVENTIONS:  - Assess pt frequently for physical needs  - Identify cognitive and physical deficits and behaviors that affect risk of falls.   - [place] fall precautions as indica (undernourished)  Description  INTERVENTIONS:  - Monitor percentage of each meal consumed  - Identify factors contributing to decreased intake, treat as appropriate  - Assist with meals as needed  - Monitor I&O, WT and lab values  - Obtain nutritional cons RN  Outcome: Progressing  8/19/2020 0830 by Sonal Mercado RN  Outcome: Progressing  Goal: Optimize infant feeding at the breast  Description  INTERVENTIONS:  - Initiate breast feeding within first hour after birth.    - Monitor effectiveness of curre daily care. - Assess support systems available to mother/family.  - Provide /case management support as needed.   8/19/2020 0830 by Gardenia Elliott RN  Outcome: Progressing  8/19/2020 0830 by Gardenia Elliott, RN  Outcome: Progressin

## 2020-08-19 NOTE — PLAN OF CARE
Problem: GASTROINTESTINAL - ADULT  Goal: Minimal or absence of nausea and vomiting  Description  INTERVENTIONS:  - Maintain adequate hydration with IV or PO as ordered and tolerated  - Nasogastric tube to low intermittent suction as ordered  - Evaluate e Obtain nutritional consult as needed  - Evaluate psychosocial factors contributing to over-consumption  8/19/2020 0830 by Rommel Townsend RN  Outcome: Completed  8/19/2020 0830 by Rommel Townsend RN  Outcome: Progressing

## 2020-08-20 NOTE — PROGRESS NOTES
[facility]  Post-Partum Caesarean Section Progress Note    Darlynthad Aguiar Patient Status:  Inpatient    10/18/1985 MRN [id number]   [facility] 1SW-J Attending Ronaldo Bell MD   Hosp Day # 2 PCP Anthony Jimenez MD       Subjective:  Po

## 2020-08-21 VITALS
WEIGHT: 164 LBS | BODY MASS INDEX: 28 KG/M2 | SYSTOLIC BLOOD PRESSURE: 94 MMHG | TEMPERATURE: 98 F | HEART RATE: 85 BPM | OXYGEN SATURATION: 98 % | DIASTOLIC BLOOD PRESSURE: 58 MMHG | HEIGHT: 64 IN | RESPIRATION RATE: 16 BRPM

## 2020-08-21 RX ORDER — HYDROCODONE BITARTRATE AND ACETAMINOPHEN 5; 325 MG/1; MG/1
1 TABLET ORAL EVERY 6 HOURS PRN
Qty: 22 TABLET | Refills: 0 | Status: SHIPPED | OUTPATIENT
Start: 2020-08-21 | End: 2020-10-06

## 2020-08-21 NOTE — PROGRESS NOTES
Postop Day 3    Pt without complaints.      Temp: 97.5 °F (36.4 °C)  Pulse: 85  Resp: 16  BP: 94/58  abd  soft, NT, ND, fundus firm below umbilicus, incision C/D/I  extr  trace edema, no calf tenderness    Impression: POD#3  Plan:  Discharge instructions re

## 2020-08-25 ENCOUNTER — TELEPHONE (OUTPATIENT)
Dept: OBGYN UNIT | Facility: HOSPITAL | Age: 35
End: 2020-08-25

## 2020-08-31 NOTE — DISCHARGE SUMMARY
[facility]  Discharge Summary    Karen Medina Patient Status:  Inpatient    10/18/1985 MRN [id number]   [facility] 1SW-J Attending No att. providers found   Hosp Day # 3 PCP Cesar Coats MD     Date of Admission: 2020    Date o

## 2021-05-27 ENCOUNTER — OFFICE VISIT (OUTPATIENT)
Dept: INTERNAL MEDICINE CLINIC | Facility: CLINIC | Age: 36
End: 2021-05-27
Payer: COMMERCIAL

## 2021-05-27 VITALS
SYSTOLIC BLOOD PRESSURE: 104 MMHG | HEART RATE: 70 BPM | RESPIRATION RATE: 16 BRPM | OXYGEN SATURATION: 100 % | BODY MASS INDEX: 23.22 KG/M2 | HEIGHT: 64 IN | DIASTOLIC BLOOD PRESSURE: 62 MMHG | WEIGHT: 136 LBS | TEMPERATURE: 97 F

## 2021-05-27 DIAGNOSIS — Z00.00 ROUTINE GENERAL MEDICAL EXAMINATION AT A HEALTH CARE FACILITY: Primary | ICD-10-CM

## 2021-05-27 DIAGNOSIS — E01.0 THYROMEGALY: ICD-10-CM

## 2021-05-27 PROBLEM — Z34.90 PREGNANCY (HCC): Status: RESOLVED | Noted: 2020-08-18 | Resolved: 2021-05-27

## 2021-05-27 PROBLEM — Z34.90 PREGNANCY: Status: RESOLVED | Noted: 2020-08-18 | Resolved: 2021-05-27

## 2021-05-27 PROCEDURE — 99395 PREV VISIT EST AGE 18-39: CPT | Performed by: INTERNAL MEDICINE

## 2021-05-27 PROCEDURE — 3078F DIAST BP <80 MM HG: CPT | Performed by: INTERNAL MEDICINE

## 2021-05-27 PROCEDURE — 3074F SYST BP LT 130 MM HG: CPT | Performed by: INTERNAL MEDICINE

## 2021-05-27 PROCEDURE — 3008F BODY MASS INDEX DOCD: CPT | Performed by: INTERNAL MEDICINE

## 2021-05-27 NOTE — PROGRESS NOTES
Windy Underwood  10/18/1985 is a 28year old female.     Patient presents with:  CPX      HPI:     Current Outpatient Medications   Medication Sig Dispense Refill   • ONEXTON 1.2-3.75 % External Gel      • Ascorbic Acid (VITAMIN C) 1000 MG Oral Tab Take 1, stools, heartburn, nausea, vomiting no weight changes noted no heart burn noted. Hematology:   Patient denies abnormal bleeding, easy bleeding, easy bruising. Enlarged lymph nodes none.    Genitourinary:   Patient denies difficulty urinating, frequent uri none.   Pulses: present, bilateral.   Tremors: no.   Varicose veins: not present.    MUSCULOSKELETAL:   Cervical spines: normal.   L-S spines: normal.   Lower extremity joints: normal.   Upper extremity joints: normal.   NEUROLOGICAL:   Babinski: negative/a

## 2021-06-01 ENCOUNTER — LAB ENCOUNTER (OUTPATIENT)
Dept: LAB | Age: 36
End: 2021-06-01
Attending: INTERNAL MEDICINE
Payer: COMMERCIAL

## 2021-06-10 ENCOUNTER — HOSPITAL ENCOUNTER (OUTPATIENT)
Dept: ULTRASOUND IMAGING | Age: 36
Discharge: HOME | End: 2021-06-10
Attending: INTERNAL MEDICINE
Payer: COMMERCIAL

## 2021-06-10 ENCOUNTER — TELEPHONE (OUTPATIENT)
Dept: INTERNAL MEDICINE CLINIC | Facility: CLINIC | Age: 36
End: 2021-06-10

## 2021-06-10 DIAGNOSIS — R82.90 ABNORMAL URINE FINDINGS: Primary | ICD-10-CM

## 2021-06-10 DIAGNOSIS — E01.0 THYROMEGALY: ICD-10-CM

## 2021-06-10 PROCEDURE — 76536 US EXAM OF HEAD AND NECK: CPT | Performed by: INTERNAL MEDICINE

## 2021-06-17 ENCOUNTER — TELEPHONE (OUTPATIENT)
Dept: INTERNAL MEDICINE CLINIC | Facility: CLINIC | Age: 36
End: 2021-06-17

## 2021-07-09 LAB
APPEARANCE: CLEAR
BILIRUBIN: NEGATIVE
COLOR: YELLOW
GLUCOSE: NEGATIVE
KETONES: NEGATIVE
NITRITE: NEGATIVE
OCCULT BLOOD: NEGATIVE
PH: 6.5 (ref 5–8)
PROTEIN: NEGATIVE
SPECIFIC GRAVITY: 1.01 (ref 1–1.03)

## 2021-08-16 ENCOUNTER — PATIENT MESSAGE (OUTPATIENT)
Dept: INTERNAL MEDICINE CLINIC | Facility: CLINIC | Age: 36
End: 2021-08-16

## 2021-08-17 NOTE — TELEPHONE ENCOUNTER
From: Marine Freedman  To: Vickey Hoffman MD  Sent: 8/16/2021 12:28 PM CDT  Subject: Non-Urgent Medical Question    Hi there,     Do you know where I can get a TB test? I need to have one for a job I am applying for.      Thank you,   Amelie Feliz

## 2021-08-19 ENCOUNTER — PATIENT MESSAGE (OUTPATIENT)
Dept: INTERNAL MEDICINE CLINIC | Facility: CLINIC | Age: 36
End: 2021-08-19

## 2021-08-19 NOTE — TELEPHONE ENCOUNTER
From: Sukhdeep Scruggs  To: Maria G Maurer MD  Sent: 8/19/2021 7:16 AM CDT  Subject: Non-Urgent Medical Question    Hi,     I was wondering where I can get a TB skin test done? I need it done for a job.  Thank you!     -Shamir Knowles

## 2021-08-24 ENCOUNTER — NURSE ONLY (OUTPATIENT)
Dept: INTERNAL MEDICINE CLINIC | Facility: CLINIC | Age: 36
End: 2021-08-24
Payer: COMMERCIAL

## 2021-08-24 PROCEDURE — 86580 TB INTRADERMAL TEST: CPT | Performed by: INTERNAL MEDICINE

## 2021-08-26 ENCOUNTER — NURSE ONLY (OUTPATIENT)
Dept: INTERNAL MEDICINE CLINIC | Facility: CLINIC | Age: 36
End: 2021-08-26
Payer: COMMERCIAL

## 2021-08-26 LAB — INDURATION (): 0 MM (ref 0–11)

## 2024-04-19 ENCOUNTER — OFFICE VISIT (OUTPATIENT)
Dept: FAMILY MEDICINE CLINIC | Facility: CLINIC | Age: 39
End: 2024-04-19
Payer: COMMERCIAL

## 2024-04-19 VITALS
RESPIRATION RATE: 16 BRPM | WEIGHT: 129 LBS | DIASTOLIC BLOOD PRESSURE: 80 MMHG | OXYGEN SATURATION: 99 % | SYSTOLIC BLOOD PRESSURE: 110 MMHG | HEART RATE: 83 BPM | BODY MASS INDEX: 22.02 KG/M2 | HEIGHT: 64 IN | TEMPERATURE: 99 F

## 2024-04-19 DIAGNOSIS — H65.191 ACUTE MEE (MIDDLE EAR EFFUSION), RIGHT: Primary | ICD-10-CM

## 2024-04-19 DIAGNOSIS — H69.91 ACUTE DYSFUNCTION OF RIGHT EUSTACHIAN TUBE: ICD-10-CM

## 2024-04-19 RX ORDER — METHYLPREDNISOLONE 4 MG/1
TABLET ORAL
Qty: 1 EACH | Refills: 0 | Status: SHIPPED | OUTPATIENT
Start: 2024-04-19

## 2024-04-19 NOTE — PROGRESS NOTES
CHIEF COMPLAINT:     Chief Complaint   Patient presents with    Ear Problem     Fluid to R ear x 1 mo  OTC Decongestant w no relief        HPI:   Swetha Enriquez is a 38 year old female who presents to clinic today with complaints of right ear pain. Has had for 1  months. Pain is described as pressure and is intermittent.  Patient denies history of ear infections. Home treatment includes antihistamines, Flonase, Mucinex D.  Concerned because she is flying to [place] next week.    Associated symptoms:  Patient denies decreased hearing. Patient denies hearing loss. Patient denies drainage. Patient denies use of cotton tipped ear swabs to clean the ears. Patient reports following URI symptoms:  none    Current Outpatient Medications   Medication Sig Dispense Refill    methylPREDNISolone (MEDROL) 4 MG Oral Tablet Therapy Pack As directed. 1 each 0    ONEXTON 1.2-3.75 % External Gel       Ascorbic Acid (VITAMIN C) 1000 MG Oral Tab Take 1,000 mg by mouth daily.      Prenatal Multivit-Min-Fe-FA (PRENATAL OR) Take by mouth.      Fluticasone Propionate (FLONASE) 50 MCG/ACT Nasal Suspension USE 1 SPRAY NASALLY DAILY 16 g 0      Past Medical History:    Hayfever    Thyroid nodule      Social History:  Social History     Socioeconomic History    Marital status:    Tobacco Use    Smoking status: Never    Smokeless tobacco: Never   Vaping Use    Vaping status: Never Used   Substance and Sexual Activity    Alcohol use: Not Currently     Alcohol/week: 0.0 standard drinks of alcohol     Comment: rare    Drug use: No    Sexual activity: Not Currently     Partners: Male        REVIEW OF SYSTEMS:   GENERAL: See HPI  SKIN: no unusual skin lesions or rashes  HEENT: See HPI  LUNGS: No shortness of breath, or wheezing.  CARDIOVASCULAR: No chest pain, palpitations  GI: No N/V/C/D.  NEURO: denies headaches or dizziness    EXAM:   /80   Pulse 83   Temp 98.9 °F (37.2 °C)   Resp 16   Ht 5' 4\" (1.626 m)   Wt 129 lb (58.5 kg)    LMP 03/27/2024   SpO2 99%   Breastfeeding No   BMI 22.14 kg/m²   GENERAL: well developed, well nourished,in no apparent distress  SKIN: no rashes,no suspicious lesions  HEAD: atraumatic, normocephalic  EYES: conjunctiva clear, EOM intact  EARS: bilateral tragus not tender with manipulation.  External auditory canals clear. Right TM: without erythema, mild bulging, no retraction, + effusion, bony landmarks visible.  Left TM: without erythema, no bulging, no retraction,no effusion, bony landmarks visible.  NOSE: nostrils patent, not nasal mucus, nasal mucosa not inflamed  THROAT: oral mucosa pink, moist. Posterior pharynx is not erythematous or injected. No exudates.  NECK: supple, non-tender  LUNGS: clear to auscultation bilaterally, no wheezes or rhonchi. Breathing is non labored.  CARDIO: RRR without murmur  EXTREMITIES: no cyanosis, clubbing or edema  LYMPH: no lymphadenopathy.      ASSESSMENT AND PLAN:   Swetha Enriquez is a 38 year old female who presents with ear problems symptoms are consistent with    ASSESSMENT:  Encounter Diagnoses   Name Primary?    Acute JOSEFA (middle ear effusion), right Yes    Acute dysfunction of right eustachian tube        PLAN: Meds as listed below.  Comfort measures as described in Patient Instructions.  If no improvement, follow up with ENT    Meds & Refills for this Visit:  Requested Prescriptions     Signed Prescriptions Disp Refills    methylPREDNISolone (MEDROL) 4 MG Oral Tablet Therapy Pack 1 each 0     Sig: As directed.         Risk and benefits of medication discussed.   If antibiotics prescribed, stressed importance of completing full course of antibiotic.     Acetaminophen or NSAID prn pain.      Follow up with PCP if s/sx worsen, do not begin to improve in 3 days, or if fever of 100.4 or greater persists for 72 hours.      Patient voiced understand and is in agreement with treatment plan.    Patient Instructions   Continue Flonase and antihistamines  For the flight -  Afrin 30 min prior to take off and landing  Sudafed 30 min prior to flight

## 2024-04-19 NOTE — PATIENT INSTRUCTIONS
Continue Flonase and antihistamines  For the flight - Afrin 30 min prior to take off and landing  Sudafed 30 min prior to flight

## 2024-05-06 NOTE — TELEPHONE ENCOUNTER
Requesting SUMAtriptan Succinate 100 MG Oral Tab  LOV: 7/9/18  RTC: If symptoms worsen or fail to improve  Filled: 9/13/18 #30 with 0 refills Spironolactone Counseling: Patient advised regarding risks of diarrhea, abdominal pain, hyperkalemia, birth defects (for female patients), liver toxicity and renal toxicity. The patient may need blood work to monitor liver and kidney function and potassium levels while on therapy. The patient verbalized understanding of the proper use and possible adverse effects of spironolactone.  All of the patient's questions and concerns were addressed.

## 2024-08-01 ENCOUNTER — OFFICE VISIT (OUTPATIENT)
Dept: INTERNAL MEDICINE CLINIC | Facility: CLINIC | Age: 39
End: 2024-08-01
Payer: COMMERCIAL

## 2024-08-01 VITALS
HEIGHT: 63 IN | DIASTOLIC BLOOD PRESSURE: 80 MMHG | OXYGEN SATURATION: 98 % | SYSTOLIC BLOOD PRESSURE: 126 MMHG | BODY MASS INDEX: 22.97 KG/M2 | WEIGHT: 129.63 LBS | TEMPERATURE: 98 F | HEART RATE: 69 BPM | RESPIRATION RATE: 16 BRPM

## 2024-08-01 DIAGNOSIS — Z00.00 ROUTINE GENERAL MEDICAL EXAMINATION AT A HEALTH CARE FACILITY: Primary | ICD-10-CM

## 2024-08-01 DIAGNOSIS — F41.9 ANXIETY: ICD-10-CM

## 2024-08-01 DIAGNOSIS — E01.0 THYROMEGALY: ICD-10-CM

## 2024-08-01 PROCEDURE — 99395 PREV VISIT EST AGE 18-39: CPT | Performed by: INTERNAL MEDICINE

## 2024-08-01 RX ORDER — SPIRONOLACTONE 100 MG/1
100 TABLET, FILM COATED ORAL DAILY
COMMUNITY
Start: 2022-06-08 | End: 2024-08-01

## 2024-08-01 RX ORDER — TAZAROTENE 0.45 MG/G
LOTION TOPICAL
COMMUNITY
Start: 2024-07-17

## 2024-08-01 RX ORDER — IVERMECTIN 10 MG/G
CREAM TOPICAL
COMMUNITY
Start: 2024-04-15

## 2024-08-01 RX ORDER — MINOCYCLINE HYDROCHLORIDE 100 MG/1
100 CAPSULE ORAL DAILY
COMMUNITY

## 2024-08-01 RX ORDER — CLASCOTERONE 1 G/100G
CREAM TOPICAL
COMMUNITY
Start: 2024-07-31

## 2024-08-01 RX ORDER — SPIRONOLACTONE 50 MG/1
TABLET, FILM COATED ORAL
COMMUNITY
Start: 2024-07-17

## 2024-08-01 RX ORDER — CETIRIZINE HYDROCHLORIDE 10 MG/1
10 TABLET ORAL DAILY
COMMUNITY

## 2024-08-01 NOTE — PROGRESS NOTES
Swetha Enriquez  10/18/1985 is a 38 year old female.    Chief Complaint   Patient presents with    Physical       HPI:     Current Outpatient Medications   Medication Sig Dispense Refill    cetirizine 10 MG Oral Tab Take 1 tablet (10 mg total) by mouth daily.      minocycline 100 MG Oral Cap Take 1 capsule (100 mg total) by mouth daily.      WINLEVI 1 % External Cream       SOOLANTRA 1 % External Cream Apply daily to face by topical route.      spironolactone 50 MG Oral Tab Take three tablets by BY MOUTH route ONCE DAILY AT BEDTIME      ARAZLO 0.045 % External Lotion Apply A pea sized AMOUNT total full FACE nightly      ONEXTON 1.2-3.75 % External Gel       Prenatal Multivit-Min-Fe-FA (PRENATAL OR) Take by mouth.      Fluticasone Propionate (FLONASE) 50 MCG/ACT Nasal Suspension USE 1 SPRAY NASALLY DAILY 16 g 0      Past Medical History:    Hayfever    Thyroid nodule      Social History:  Social History     Socioeconomic History    Marital status:    Tobacco Use    Smoking status: Never    Smokeless tobacco: Never   Vaping Use    Vaping status: Never Used   Substance and Sexual Activity    Alcohol use: Yes     Comment: occ    Drug use: No    Sexual activity: Not Currently     Partners: Male        REVIEW OF SYSTEMS:     General/Constitutional:   General able to do usual activities, good exercise tolerance, good general state of health, no fatigue, no fever, no weakness, no weight loss or gain .   HEENT/Neck:   Head no headache, no dizziness, no lightheadedness. Eyes normal vision, no redness, no drainage. Ears no earaches, no fullness, normal hearing, no tinnitus. Nose and Sinuses no recurrent colds, no discharge, no itching, no hay fever, no nosebleeds, no sinus trouble. Mouth and Pharynx no sore throats, no hoarseness. Neck no lumps, no goiter, no neck stiffness or pain.   Endocrine:   Diabetes none. Thyroid disorder nodule .   Respiratory:   Patient denies chest pain, cough, PERSAUD (dyspnea on exertion),  chest congestion, blood-tinged sputum/wheezing. Breathing normal pattern .   Cardiovascular:   Patient denies chest pain, shortness of breath/rheumatic fever/murmur/dizzziness cholesterol normal. Leg edema none. Orthopnea none. Palpitations none. PND (paroxsymal nocturnal dyspnea) none. Exercise daily  vigorous   Gastrointestinal:   Patient denies abdominal pain, blood in stool, constipation, diarrhea, difficulty swallowing, change in stools, heartburn, nausea, vomiting no weight changes noted no heart burn noted.   Hematology:   Patient denies abnormal bleeding, easy bleeding, easy bruising. Enlarged lymph nodes none.   Genitourinary:   Patient denies difficulty urinating, frequent urination, hematuria. Dysuria none. Nocturia None. no Urinary frequency. no Urinary incontinence. Sees gyn   Musculoskeletal:   Arthritis None. Back pain none. Joint pain no. Joint stiffness no. Muscle weakness none.   Peripheral Vascular:   General no varicosities, no claudication.   Dermatologic:   Rash acne seeing derm   Neurologic:   Patient denies dizziness, fainting, loss of consciousness, weakness. Memory loss none. Tingling/numbness none. Trouble with balance none.   Psychiatric:   Patient denies depression, hallucinations, memory loss. no Anxiety, yes chronic insomnia none.                EXAM:   /80   Pulse 69   Temp 98.4 °F (36.9 °C) (Temporal)   Resp 16   Ht 5' 3\" (1.6 m)   Wt 129 lb 9.6 oz (58.8 kg)   LMP 07/30/2024   SpO2 98%   BMI 22.96 kg/m²     GENERAL:   Build: normal .   General Appearance: alert and oriented, pleasant.   HEENT:   Ear canals: normal.   EOM: within normal limit-conjunctiva normal.   Fundi: normal.   Head: normocephalic.   Nasal septum: midline-injected.   Nose: unremarkable.   Oral cavity: normal.   Pupils: normal, bilaterally .   Sclera: normal.   Turbinates: normal.   NECK:   Carotid bruit: none.   Cervical lymph nodes: unremarkable.   JVD: none.   Range of Motion: normal.   Thyroid:  enlarged   HEART:   Clicks: absent .   Edema: none visible .   Heart sounds: normal.   Murmurs: none.   Rhythm: regular.   LUNGS:   Auscultation: clear .   Chest Shape: normal .   Percussion: normal.   Rales: no .   Respiratory effort: normal .   Rhonchi: no.   Wheezes: no.   ABDOMEN:   General: normal.   Hernia: absent.   Inguinal nodes: none.   Liver, Spleen: non-enlarged.   Rebound tenderness: absent.   Tenderness: absent .   EXTREMITIES:   Clubbing: none.   Cyanosis: absent .   Edema: none.   Pulses: present, bilateral.   Tremors: no.   Varicose veins: not present.   MUSCULOSKELETAL:   Cervical spines: normal.   L-S spines: normal.   Lower extremity joints: normal.   Upper extremity joints: normal.   NEUROLOGICAL:   Babinski: negative/all reflexes are normal.   Cerebellar Testing grossly/intact: yes.   Gait: normal.   Motor: power-normal/tone -normal/co-ordination normal/wasting -none/involuntary movements -none.   Sensory: normal sensation to all modalities.   LYMPHATICS:   Groin: no adenopathy .   Inguinal: no adenopathy.   Supraclavicular: none.   DERMATOLOGY:   Rash: no.           ASSESSMENT AND PLAN:   Swetha was seen today for physical.    Diagnoses and all orders for this visit:    Routine general medical examination at a health care facility  -     CBC With Differential With Platelet  -     Comp Metabolic Panel (14)  -     Hemoglobin A1C  -     Lipid Panel  -     T4 (Thyroxine Total)  -     Assay, Thyroid Stim Hormone  -     Urinalysis, Routine    Thyromegaly  -     US THYROID (CPT=76536); Future    Anxiety  -     OP REFERRAL TO [facility]           Patient Instructions   Anxiety best left to be managed by the appropriate specialty -pending blood work   The patient indicates understanding of these issues and agrees to the plan.  The patient is asked to Return in about 1 year (around 8/1/2025).  .      Seymour Serrano MD

## 2024-08-09 ENCOUNTER — LAB ENCOUNTER (OUTPATIENT)
Dept: LAB | Age: 39
End: 2024-08-09
Attending: INTERNAL MEDICINE
Payer: COMMERCIAL

## 2024-08-09 LAB
ALBUMIN SERPL-MCNC: 4.7 G/DL (ref 3.2–4.8)
ALBUMIN/GLOB SERPL: 1.6 {RATIO} (ref 1–2)
ALP LIVER SERPL-CCNC: 71 U/L
ALT SERPL-CCNC: 15 U/L
ANION GAP SERPL CALC-SCNC: 8 MMOL/L (ref 0–18)
AST SERPL-CCNC: 28 U/L (ref ?–34)
BASOPHILS # BLD AUTO: 0.04 X10(3) UL (ref 0–0.2)
BASOPHILS NFR BLD AUTO: 0.9 %
BILIRUB SERPL-MCNC: 0.8 MG/DL (ref 0.3–1.2)
BILIRUB UR QL STRIP.AUTO: NEGATIVE
BUN BLD-MCNC: 14 MG/DL (ref 9–23)
CALCIUM BLD-MCNC: 10 MG/DL (ref 8.7–10.4)
CHLORIDE SERPL-SCNC: 104 MMOL/L (ref 98–112)
CHOLEST SERPL-MCNC: 184 MG/DL (ref ?–200)
CLARITY UR REFRACT.AUTO: CLEAR
CO2 SERPL-SCNC: 26 MMOL/L (ref 21–32)
COLOR UR AUTO: YELLOW
CREAT BLD-MCNC: 1 MG/DL
EGFRCR SERPLBLD CKD-EPI 2021: 74 ML/MIN/1.73M2 (ref 60–?)
EOSINOPHIL # BLD AUTO: 0.28 X10(3) UL (ref 0–0.7)
EOSINOPHIL NFR BLD AUTO: 6.1 %
ERYTHROCYTE [DISTWIDTH] IN BLOOD BY AUTOMATED COUNT: 12.6 %
EST. AVERAGE GLUCOSE BLD GHB EST-MCNC: 105 MG/DL (ref 68–126)
FASTING PATIENT LIPID ANSWER: YES
FASTING STATUS PATIENT QL REPORTED: YES
GLOBULIN PLAS-MCNC: 2.9 G/DL (ref 2–3.5)
GLUCOSE BLD-MCNC: 78 MG/DL (ref 70–99)
GLUCOSE UR STRIP.AUTO-MCNC: NORMAL MG/DL
HBA1C MFR BLD: 5.3 % (ref ?–5.7)
HCT VFR BLD AUTO: 44.4 %
HDLC SERPL-MCNC: 76 MG/DL (ref 40–59)
HGB BLD-MCNC: 14.7 G/DL
IMM GRANULOCYTES # BLD AUTO: 0 X10(3) UL (ref 0–1)
IMM GRANULOCYTES NFR BLD: 0 %
KETONES UR STRIP.AUTO-MCNC: NEGATIVE MG/DL
LDLC SERPL CALC-MCNC: 99 MG/DL (ref ?–100)
LEUKOCYTE ESTERASE UR QL STRIP.AUTO: 500
LYMPHOCYTES # BLD AUTO: 1.56 X10(3) UL (ref 1–4)
LYMPHOCYTES NFR BLD AUTO: 33.8 %
MCH RBC QN AUTO: 30 PG (ref 26–34)
MCHC RBC AUTO-ENTMCNC: 33.1 G/DL (ref 31–37)
MCV RBC AUTO: 90.6 FL
MONOCYTES # BLD AUTO: 0.4 X10(3) UL (ref 0.1–1)
MONOCYTES NFR BLD AUTO: 8.7 %
NEUTROPHILS # BLD AUTO: 2.33 X10 (3) UL (ref 1.5–7.7)
NEUTROPHILS # BLD AUTO: 2.33 X10(3) UL (ref 1.5–7.7)
NEUTROPHILS NFR BLD AUTO: 50.5 %
NITRITE UR QL STRIP.AUTO: NEGATIVE
NONHDLC SERPL-MCNC: 108 MG/DL (ref ?–130)
OSMOLALITY SERPL CALC.SUM OF ELEC: 285 MOSM/KG (ref 275–295)
PH UR STRIP.AUTO: 6 [PH] (ref 5–8)
PLATELET # BLD AUTO: 307 10(3)UL (ref 150–450)
POTASSIUM SERPL-SCNC: 4.9 MMOL/L (ref 3.5–5.1)
PROT SERPL-MCNC: 7.6 G/DL (ref 5.7–8.2)
RBC # BLD AUTO: 4.9 X10(6)UL
RBC UR QL AUTO: NEGATIVE
SODIUM SERPL-SCNC: 138 MMOL/L (ref 136–145)
SP GR UR STRIP.AUTO: 1.03 (ref 1–1.03)
T4 SERPL-MCNC: 7.9 UG/DL
TRIGL SERPL-MCNC: 44 MG/DL (ref 30–149)
TSI SER-ACNC: 0.86 MIU/ML (ref 0.55–4.78)
UROBILINOGEN UR STRIP.AUTO-MCNC: NORMAL MG/DL
VLDLC SERPL CALC-MCNC: 7 MG/DL (ref 0–30)
WBC # BLD AUTO: 4.6 X10(3) UL (ref 4–11)

## 2024-08-22 ENCOUNTER — PATIENT MESSAGE (OUTPATIENT)
Dept: INTERNAL MEDICINE CLINIC | Facility: CLINIC | Age: 39
End: 2024-08-22

## 2024-08-23 NOTE — TELEPHONE ENCOUNTER
MA's: please see forms, thanks!    From: Swetha Enriquez  To: Seymour Serrano  Sent: 8/22/2024  9:34 PM CDT  Subject: Biometric Form    Hi! Would you mind filling out this medical form for a Biometric Screening? 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 Thank you!

## 2024-08-27 NOTE — TELEPHONE ENCOUNTER
Forms signed made 2 copies one for scan and one in the purple folder pod #2.    Please call patient let her know forms are ready to be picked up.

## 2024-08-29 NOTE — TELEPHONE ENCOUNTER
Retrieved forms form accordion folder at front office. Placed forms in mail out box to be sent to patient's home.

## 2024-09-09 ENCOUNTER — OFFICE VISIT (OUTPATIENT)
Dept: FAMILY MEDICINE CLINIC | Facility: CLINIC | Age: 39
End: 2024-09-09
Payer: COMMERCIAL

## 2024-09-09 VITALS
RESPIRATION RATE: 18 BRPM | SYSTOLIC BLOOD PRESSURE: 113 MMHG | BODY MASS INDEX: 21.34 KG/M2 | WEIGHT: 125 LBS | HEIGHT: 64 IN | TEMPERATURE: 98 F | OXYGEN SATURATION: 98 % | HEART RATE: 86 BPM | DIASTOLIC BLOOD PRESSURE: 76 MMHG

## 2024-09-09 DIAGNOSIS — Z11.52 ENCOUNTER FOR SCREENING FOR COVID-19: Primary | ICD-10-CM

## 2024-09-09 DIAGNOSIS — H65.192 ACUTE MEE (MIDDLE EAR EFFUSION), LEFT: ICD-10-CM

## 2024-09-09 PROCEDURE — 3008F BODY MASS INDEX DOCD: CPT | Performed by: NURSE PRACTITIONER

## 2024-09-09 PROCEDURE — 3074F SYST BP LT 130 MM HG: CPT | Performed by: NURSE PRACTITIONER

## 2024-09-09 PROCEDURE — 3078F DIAST BP <80 MM HG: CPT | Performed by: NURSE PRACTITIONER

## 2024-09-09 PROCEDURE — 87635 SARS-COV-2 COVID-19 AMP PRB: CPT | Performed by: NURSE PRACTITIONER

## 2024-09-09 PROCEDURE — 99213 OFFICE O/P EST LOW 20 MIN: CPT | Performed by: NURSE PRACTITIONER

## 2024-09-10 LAB — SARS-COV-2 RNA RESP QL NAA+PROBE: NOT DETECTED

## 2024-09-10 NOTE — PROGRESS NOTES
CHIEF COMPLAINT:     Chief Complaint   Patient presents with    Ear Pain     Left ear pain and post nasal drip. Symptoms for 3 days   OTC: Mucinex D and Advil        HPI:   Swetha Enriquez is a 38 year old female who presents to clinic today with complaints of left ear pain. Has had for 3  days. Pain is described as clogging.  Patient denies history of ear infections. Home treatment includes Mucinex D and ibuprofen.     Associated symptoms:  Patient denies decreased hearing. Patient denies hearing loss. Patient denies drainage. Patient denies use of cotton tipped ear swabs to clean the ears. Patient reports following URI symptoms: congestion. Seasonal allergies have been bad.    Current Outpatient Medications   Medication Sig Dispense Refill    cetirizine 10 MG Oral Tab Take 1 tablet (10 mg total) by mouth daily.      minocycline 100 MG Oral Cap Take 1 capsule (100 mg total) by mouth daily.      WINLEVI 1 % External Cream       SOOLANTRA 1 % External Cream Apply daily to face by topical route.      spironolactone 50 MG Oral Tab Take three tablets by BY MOUTH route ONCE DAILY AT BEDTIME      ARAZLO 0.045 % External Lotion Apply A pea sized AMOUNT total full FACE nightly      ONEXTON 1.2-3.75 % External Gel       Prenatal Multivit-Min-Fe-FA (PRENATAL OR) Take by mouth.      Fluticasone Propionate (FLONASE) 50 MCG/ACT Nasal Suspension USE 1 SPRAY NASALLY DAILY 16 g 0      Past Medical History:    Hayfever    Thyroid nodule      Social History:  Social History     Socioeconomic History    Marital status:    Tobacco Use    Smoking status: Never    Smokeless tobacco: Never   Vaping Use    Vaping status: Never Used   Substance and Sexual Activity    Alcohol use: Yes     Comment: occ    Drug use: No    Sexual activity: Not Currently     Partners: Male        REVIEW OF SYSTEMS:   GENERAL: See HPI  SKIN: no unusual skin lesions or rashes  HEENT: See HPI  LUNGS: No shortness of breath, or wheezing.  CARDIOVASCULAR: No  chest pain, palpitations  GI: No N/V/C/D.  NEURO: denies headaches or dizziness    EXAM:   /76   Pulse 86   Temp 97.7 °F (36.5 °C)   Resp 18   Ht 5' 4\" (1.626 m)   Wt 125 lb (56.7 kg)   LMP 07/30/2024 (Approximate)   SpO2 98%   BMI 21.46 kg/m²   GENERAL: well developed, well nourished,in no apparent distress  SKIN: no rashes,no suspicious lesions  HEAD: atraumatic, normocephalic  EYES: conjunctiva clear, EOM intact  EARS: bilateral tragus not tender with manipulation.  External auditory canals clear. Right TM: without erythema, no bulging, no retraction,mild effusion, bony landmarks visible.  Left TM: without erythema, no bulging, no retraction,+ effusion, bony landmarks visible.  NOSE: nostrils patent, clear nasal mucus, nasal mucosa inflamed  THROAT: oral mucosa pink, moist. Posterior pharynx is not erythematous or injected. No exudates.  NECK: supple, non-tender  LUNGS: clear to auscultation bilaterally, no wheezes or rhonchi. Breathing is non labored.  CARDIO: RRR without murmur  EXTREMITIES: no cyanosis, clubbing or edema  LYMPH: no cervical lymphadenopathy.      ASSESSMENT AND PLAN:   Swetha Enriquez is a 38 year old female who presents with ear problems symptoms are consistent with    ASSESSMENT:  Encounter Diagnoses   Name Primary?    Encounter for screening for COVID-19 Yes    Acute JOSEFA (middle ear effusion), left        PLAN: Meds as listed below.  Comfort measures as described in Patient Instructions    Meds & Refills for this Visit:  Requested Prescriptions      No prescriptions requested or ordered in this encounter         Risk and benefits of medication discussed.     Acetaminophen or NSAID prn pain.      Follow up with PCP if s/sx worsen, do not begin to improve in 3 days, or if fever of 100.4 or greater persists for 72 hours.      Patient voiced understand and is in agreement with treatment plan.    Patient Instructions   Flonase daily  Benadryl at night  Antihistamine in the day

## (undated) DEVICE — LARGE, DISPOSABLE ALEXIS O C-SECTION PROTECTOR - RETRACTOR: Brand: ALEXIS ® O C-SECTION PROTECTOR - RETRACTOR

## (undated) NOTE — LETTER
Yamini Husain 89  [address] [facility]  Authorization for Surgical Operation and Procedure     Date:___________                                                                                                         Time:__________ reactions, transmission of diseases such as Hepatitis, AIDS and Cytomegalovirus (CMV) and fluid overload. In the event that I wish to have an autologous transfusion of my own blood, or a directed donor transfusion. I will discuss this with my physician. ends for purposes of reinstating the DNAR order.   10. Patients having a sterilization procedure: I understand that if the procedure is successful the results will be permanent and it will therefore be impossible for me to inseminate, conceive, or bear chil using an “IV” to give memedicine, fluids or blood during my procedure, and having a breathing tube placed to help me breathe when I’m asleep (intubation).  In the event that my heart stops working properly, I understand that my anesthesiologist will make ev bleeding, infection, seizure, irregular heart rhythms, and nerve injury.     I can change my mind about having anesthesia services at any time before I get the medicine.    _____________________________________________________________________________  [facility]